# Patient Record
Sex: MALE | Race: WHITE | NOT HISPANIC OR LATINO | Employment: FULL TIME | URBAN - METROPOLITAN AREA
[De-identification: names, ages, dates, MRNs, and addresses within clinical notes are randomized per-mention and may not be internally consistent; named-entity substitution may affect disease eponyms.]

---

## 2022-02-11 ENCOUNTER — TELEPHONE (OUTPATIENT)
Dept: CARDIOLOGY | Facility: CLINIC | Age: 57
End: 2022-02-11

## 2022-02-11 NOTE — TELEPHONE ENCOUNTER
Discussed with patient that we will proceed with xray of the knees bilaterally. If this is negative, will order for physical therapy. Discussed that they may need to follow up with orthopedic surgery if there is gross deformity found on xray.   Scheduling please call and schedule new pt apt for this pt.  Wife see's Dr Elkins.  Thanks  Airam Wei RN  Triage nurse

## 2022-02-22 ENCOUNTER — OFFICE VISIT (OUTPATIENT)
Dept: CARDIOLOGY | Facility: CLINIC | Age: 57
End: 2022-02-22

## 2022-02-22 VITALS
DIASTOLIC BLOOD PRESSURE: 90 MMHG | HEART RATE: 83 BPM | SYSTOLIC BLOOD PRESSURE: 136 MMHG | OXYGEN SATURATION: 99 % | HEIGHT: 72 IN | WEIGHT: 205.8 LBS | BODY MASS INDEX: 27.87 KG/M2

## 2022-02-22 DIAGNOSIS — I47.1 PAROXYSMAL SVT (SUPRAVENTRICULAR TACHYCARDIA): Primary | ICD-10-CM

## 2022-02-22 PROCEDURE — 99204 OFFICE O/P NEW MOD 45 MIN: CPT | Performed by: INTERNAL MEDICINE

## 2022-02-22 NOTE — PROGRESS NOTES
"      CARDIOLOGY    Vinod Elkins MD    ENCOUNTER DATE:  02/22/2022    Vincent Cobos / 56 y.o. / male        CHIEF COMPLAINT / REASON FOR OFFICE VISIT     Palpitations      HISTORY OF PRESENT ILLNESS       HPI  Vincent Cobos is a 56 y.o. male who presents today for consultation. Patient was seen by Dr. Dylan servin in 2015 at which time he had undergone an SVT ablation. He presents today for reevaluation. He has been having episodes of which his heart has been skipping and he was concerned he was going into atrial fibrillation or having his SVT back. He got a cardio monitor over-the-counter and it was reading possible atrial fibrillation. I did review the strips and I think it is PACs. Patient had a 14-day monitor placed on him and he is currently wearing it is good to come off in about a week or so. He denies syncope near syncope chest pain shortness of breath or lower extremity edema.      The following portions of the patient's history were reviewed and updated as appropriate: allergies, current medications, past family history, past medical history, past social history, past surgical history and problem list.      VITAL SIGNS     Visit Vitals  /90 (BP Location: Left arm)   Pulse 83   Ht 182.9 cm (72\")   Wt 93.4 kg (205 lb 12.8 oz)   SpO2 99%   BMI 27.91 kg/m²         Wt Readings from Last 3 Encounters:   02/22/22 93.4 kg (205 lb 12.8 oz)   01/26/15 93.4 kg (206 lb)   07/01/13 94.3 kg (208 lb 0.1 oz)     Body mass index is 27.91 kg/m².      REVIEW OF SYSTEMS   Review of Systems   All other systems reviewed and are negative.          PHYSICAL EXAMINATION     Vitals reviewed.   Constitutional:       Appearance: Healthy appearance.   Pulmonary:      Effort: Pulmonary effort is normal.   Cardiovascular:      Normal rate. Regular rhythm. Normal S1. Normal S2.      Murmurs: There is no murmur.      No gallop. No click. No rub.   Pulses:     Intact distal pulses.   Edema:     Peripheral edema absent. "   Neurological:      Mental Status: Alert and oriented to person, place and time.           REVIEWED DATA     Procedures    Cardiac Procedures:  1.           ASSESSMENT & PLAN      Diagnosis Plan   1. Paroxysmal SVT (supraventricular tachycardia) (HCC)           SUMMARY/DISCUSSION  1. Patient with a history of ablation. I explained to him that he is now 8 years since his ablation. 1 thing I was concerned about was patients are always told the fibers can grow back. It should have grown way back before now and more than likely they will not at this point. I did review the strips I think they are PACs. It will be interesting to see what his 14-day monitor shows. Will await that make further recommendations. At this point I reassured him as much as I could.        MEDICATIONS         Discharge Medications      as of February 22, 2022  2:03 PM     Patient Not Prescribed Medications Upon Discharge             **Hussain Disclaimer:   Much of this encounter note is an electronic transcription/translation of spoken language to printed text. The electronic translation of spoken language may permit erroneous, or at times, nonsensical words or phrases to be inadvertently transcribed. Although I have reviewed the note for such errors, some may still exist.

## 2025-01-27 NOTE — TELEPHONE ENCOUNTER
pts wife is calling in to get an apt for her  who is in afib.  He has not been seen in the office sense 2015.  Wife stated the Afib was recorded on a [employer] mobile device. Wife stated that the afib is new for him, he is not on any meds.  She reports heart rate is in the 50s.    I have advised they call the PCP    Do you want him to schedule with Dr Richardson or get established with a general cardiologist first?  thanks  Airam Wei RN  Triage nurse     FAUZIA Ulrich